# Patient Record
Sex: FEMALE | Race: WHITE
[De-identification: names, ages, dates, MRNs, and addresses within clinical notes are randomized per-mention and may not be internally consistent; named-entity substitution may affect disease eponyms.]

---

## 2021-07-21 ENCOUNTER — HOSPITAL ENCOUNTER (EMERGENCY)
Dept: HOSPITAL 49 - FER | Age: 76
Discharge: HOME | End: 2021-07-21
Payer: MEDICARE

## 2021-07-21 DIAGNOSIS — Z88.2: ICD-10-CM

## 2021-07-21 DIAGNOSIS — Z88.0: ICD-10-CM

## 2021-07-21 DIAGNOSIS — I10: ICD-10-CM

## 2021-07-21 DIAGNOSIS — Z79.52: ICD-10-CM

## 2021-07-21 DIAGNOSIS — R21: Primary | ICD-10-CM

## 2021-07-21 DIAGNOSIS — Z88.1: ICD-10-CM

## 2022-03-14 ENCOUNTER — PRE-ADMISSION TESTING (OUTPATIENT)
Dept: PREADMISSION TESTING | Facility: HOSPITAL | Age: 77
End: 2022-03-14

## 2022-03-14 VITALS
OXYGEN SATURATION: 98 % | RESPIRATION RATE: 16 BRPM | HEART RATE: 75 BPM | BODY MASS INDEX: 34.55 KG/M2 | HEIGHT: 60 IN | DIASTOLIC BLOOD PRESSURE: 78 MMHG | WEIGHT: 176 LBS | SYSTOLIC BLOOD PRESSURE: 172 MMHG | TEMPERATURE: 97.7 F

## 2022-03-14 LAB
ANION GAP SERPL CALCULATED.3IONS-SCNC: 11 MMOL/L (ref 5–15)
BASOPHILS # BLD AUTO: 0.07 10*3/MM3 (ref 0–0.2)
BASOPHILS NFR BLD AUTO: 1.1 % (ref 0–1.5)
BUN SERPL-MCNC: 12 MG/DL (ref 8–23)
BUN/CREAT SERPL: 16.2 (ref 7–25)
CALCIUM SPEC-SCNC: 9.8 MG/DL (ref 8.6–10.5)
CHLORIDE SERPL-SCNC: 102 MMOL/L (ref 98–107)
CO2 SERPL-SCNC: 26 MMOL/L (ref 22–29)
CREAT SERPL-MCNC: 0.74 MG/DL (ref 0.57–1)
DEPRECATED RDW RBC AUTO: 41.1 FL (ref 37–54)
EGFRCR SERPLBLD CKD-EPI 2021: 85 ML/MIN/1.73
EOSINOPHIL # BLD AUTO: 0.12 10*3/MM3 (ref 0–0.4)
EOSINOPHIL NFR BLD AUTO: 1.9 % (ref 0.3–6.2)
ERYTHROCYTE [DISTWIDTH] IN BLOOD BY AUTOMATED COUNT: 12.6 % (ref 12.3–15.4)
GLUCOSE SERPL-MCNC: 102 MG/DL (ref 65–99)
HCT VFR BLD AUTO: 40.6 % (ref 34–46.6)
HGB BLD-MCNC: 14.4 G/DL (ref 12–15.9)
IMM GRANULOCYTES # BLD AUTO: 0.03 10*3/MM3 (ref 0–0.05)
IMM GRANULOCYTES NFR BLD AUTO: 0.5 % (ref 0–0.5)
LYMPHOCYTES # BLD AUTO: 1.61 10*3/MM3 (ref 0.7–3.1)
LYMPHOCYTES NFR BLD AUTO: 25.6 % (ref 19.6–45.3)
MCH RBC QN AUTO: 32.1 PG (ref 26.6–33)
MCHC RBC AUTO-ENTMCNC: 35.5 G/DL (ref 31.5–35.7)
MCV RBC AUTO: 90.6 FL (ref 79–97)
MONOCYTES # BLD AUTO: 0.52 10*3/MM3 (ref 0.1–0.9)
MONOCYTES NFR BLD AUTO: 8.3 % (ref 5–12)
NEUTROPHILS NFR BLD AUTO: 3.93 10*3/MM3 (ref 1.7–7)
NEUTROPHILS NFR BLD AUTO: 62.6 % (ref 42.7–76)
NRBC BLD AUTO-RTO: 0 /100 WBC (ref 0–0.2)
PLATELET # BLD AUTO: 337 10*3/MM3 (ref 140–450)
PMV BLD AUTO: 10 FL (ref 6–12)
POTASSIUM SERPL-SCNC: 4.1 MMOL/L (ref 3.5–5.2)
QT INTERVAL: 376 MS
RBC # BLD AUTO: 4.48 10*6/MM3 (ref 3.77–5.28)
SARS-COV-2 ORF1AB RESP QL NAA+PROBE: NOT DETECTED
SODIUM SERPL-SCNC: 139 MMOL/L (ref 136–145)
WBC NRBC COR # BLD: 6.28 10*3/MM3 (ref 3.4–10.8)

## 2022-03-14 PROCEDURE — 93005 ELECTROCARDIOGRAM TRACING: CPT

## 2022-03-14 PROCEDURE — 80048 BASIC METABOLIC PNL TOTAL CA: CPT

## 2022-03-14 PROCEDURE — 36415 COLL VENOUS BLD VENIPUNCTURE: CPT

## 2022-03-14 PROCEDURE — U0004 COV-19 TEST NON-CDC HGH THRU: HCPCS

## 2022-03-14 PROCEDURE — 93010 ELECTROCARDIOGRAM REPORT: CPT | Performed by: INTERNAL MEDICINE

## 2022-03-14 PROCEDURE — C9803 HOPD COVID-19 SPEC COLLECT: HCPCS

## 2022-03-14 PROCEDURE — 85025 COMPLETE CBC W/AUTO DIFF WBC: CPT

## 2022-03-14 RX ORDER — ASPIRIN 81 MG/1
81 TABLET ORAL DAILY
COMMUNITY

## 2022-03-14 NOTE — DISCHARGE INSTRUCTIONS
Take the following medications the morning of surgery:    NONE    If you are on prescription narcotic pain medication to control your pain you may also take that medication the morning of surgery.    General Instructions:  Do not eat solid food after midnight the night before surgery.  You may drink clear liquids day of surgery but must stop at least one hour before your hospital arrival time.  It is beneficial for you to have a clear drink that contains carbohydrates the day of surgery.  We suggest a 12 to 20 ounce bottle of Gatorade or Powerade for non-diabetic patients     Clear liquids are liquids you can see through.  Nothing red in color.     Plain water                               Sports drinks  Sodas                                   Gelatin (Jell-O)  Fruit juices without pulp such as white grape juice and apple juice  Popsicles that contain no fruit or yogurt  Tea or coffee (no cream or milk added)  Gatorade / Powerade  G2 / Powerade Zero         Bring any papers given to you in the doctor’s office.  Wear clean comfortable clothes.  Do not wear contact lenses, false eyelashes or make-up.  Bring a case for your glasses.   Remove all piercings.  Leave jewelry and any other valuables at home.  The Pre-Admission Testing nurse will instruct you to bring medications if unable to obtain an accurate list in Pre-Admission Testing.    REPORT TO SURGERY ENTRANCE ON 3- AT 0530 AM        Preventing a Surgical Site Infection:  For 2 to 3 days before surgery, avoid shaving with a razor because the razor can irritate skin and make it easier to develop an infection.    Any areas of open skin can increase the risk of a post-operative wound infection by allowing bacteria to enter and travel throughout the body.  Notify your surgeon if you have any skin wounds / rashes even if it is not near the expected surgical site.  The area will need assessed to determine if surgery should be delayed until it is healed.  The  night prior to surgery shower using a fresh bar of anti-bacterial soap (such as Dial) and clean washcloth.  Sleep in a clean bed with clean clothing.  Do not allow pets to sleep with you.  Shower on the morning of surgery using a fresh bar of anti-bacterial soap (such as Dial) and clean washcloth.  Dry with a clean towel and dress in clean clothing.  Ask your surgeon if you will be receiving antibiotics prior to surgery.  Make sure you, your family, and all healthcare providers clean their hands with soap and water or an alcohol based hand  before caring for you or your wound.    Day of surgery:  Your arrival time is approximately two hours before your scheduled surgery time.  Upon arrival, a Pre-op nurse and Anesthesiologist will review your health history, obtain vital signs, and answer questions you may have.  The only belongings needed at this time will be a list of your home medications and if applicable your C-PAP/BI-PAP machine.  A Pre-op nurse will start an IV and you may receive medication in preparation for surgery, including something to help you relax.     Please be aware that surgery does come with discomfort.  We want to make every effort to control your discomfort so please discuss any uncontrolled symptoms with your nurse.   Your doctor will most likely have prescribed pain medications.     If you are staying overnight following surgery, you will be transported to your hospital room following the recovery period.  [facility] has all private rooms.    If you have any questions please call Pre-Admission Testing at [phone].  Deductibles and co-payments are collected on the day of service. Please be prepared to pay the required co-pay, deductible or deposit on the day of service as defined by your plan.    Patient Education for Self-Quarantine Process    Following your COVID testing, we strongly recommend that you wear a mask when you are with other people and practice  social distancing.   Limit your activities to only required outings.  Wash your hands with soap and water frequently for at least 20 seconds.   Avoid touching your eyes, nose and mouth with unwashed hands.  Do not share anything - utensils, drinking glasses, food from the same bowl.   Sanitize household surfaces daily. Include all high touch areas (door handles, light switches, phones, countertops, etc.)    Call your surgeon immediately if you experience any of the following symptoms:  Sore Throat  Shortness of Breath or difficulty breathing  Cough  Chills  Body soreness or muscle pain  Headache  Fever  New loss of taste or smell  Do not arrive for your surgery ill.  Your procedure will need to be rescheduled to another time.  You will need to call your physician before the day of surgery to avoid any unnecessary exposure to hospital staff as well as other patients.

## 2022-03-15 ENCOUNTER — ANESTHESIA EVENT (OUTPATIENT)
Dept: PERIOP | Facility: HOSPITAL | Age: 77
End: 2022-03-15

## 2022-03-16 ENCOUNTER — HOSPITAL ENCOUNTER (OUTPATIENT)
Facility: HOSPITAL | Age: 77
Discharge: HOME | End: 2022-03-17
Attending: OBSTETRICS & GYNECOLOGY | Admitting: OBSTETRICS & GYNECOLOGY

## 2022-03-16 ENCOUNTER — ANESTHESIA (OUTPATIENT)
Dept: PERIOP | Facility: HOSPITAL | Age: 77
End: 2022-03-16

## 2022-03-16 DIAGNOSIS — N81.10 VAGINAL PROLAPSE: ICD-10-CM

## 2022-03-16 DIAGNOSIS — N81.4 PELVIC RELAXATION DUE TO UTEROVAGINAL PROLAPSE: Primary | ICD-10-CM

## 2022-03-16 PROCEDURE — 25010000002 FENTANYL CITRATE (PF) 50 MCG/ML SOLUTION: Performed by: NURSE ANESTHETIST, CERTIFIED REGISTERED

## 2022-03-16 PROCEDURE — 25010000002 NEOSTIGMINE 5 MG/10ML SOLUTION: Performed by: NURSE ANESTHETIST, CERTIFIED REGISTERED

## 2022-03-16 PROCEDURE — C1771 REP DEV, URINARY, W/SLING: HCPCS | Performed by: OBSTETRICS & GYNECOLOGY

## 2022-03-16 PROCEDURE — 25010000002 ROPIVACAINE PER 1 MG: Performed by: OBSTETRICS & GYNECOLOGY

## 2022-03-16 PROCEDURE — 25010000002 PROPOFOL 10 MG/ML EMULSION: Performed by: NURSE ANESTHETIST, CERTIFIED REGISTERED

## 2022-03-16 PROCEDURE — 25010000002 KETOROLAC TROMETHAMINE PER 15 MG: Performed by: OBSTETRICS & GYNECOLOGY

## 2022-03-16 PROCEDURE — 25010000002 PHENYLEPHRINE 10 MG/ML SOLUTION: Performed by: NURSE ANESTHETIST, CERTIFIED REGISTERED

## 2022-03-16 PROCEDURE — 25010000002 EPINEPHRINE PER 0.1 MG: Performed by: OBSTETRICS & GYNECOLOGY

## 2022-03-16 PROCEDURE — 25010000002 ONDANSETRON PER 1 MG: Performed by: NURSE ANESTHETIST, CERTIFIED REGISTERED

## 2022-03-16 PROCEDURE — 88305 TISSUE EXAM BY PATHOLOGIST: CPT | Performed by: OBSTETRICS & GYNECOLOGY

## 2022-03-16 PROCEDURE — 25010000002 GENTAMICIN PER 80 MG: Performed by: OBSTETRICS & GYNECOLOGY

## 2022-03-16 PROCEDURE — 25010000002 DEXAMETHASONE PER 1 MG: Performed by: NURSE ANESTHETIST, CERTIFIED REGISTERED

## 2022-03-16 DEVICE — TRANSVAGINAL MID-URETHRAL SLING
Type: IMPLANTABLE DEVICE | Site: VAGINA | Status: FUNCTIONAL
Brand: ADVANTAGE FIT™  SYSTEM

## 2022-03-16 RX ORDER — EPHEDRINE SULFATE 50 MG/ML
5 INJECTION, SOLUTION INTRAVENOUS ONCE AS NEEDED
Status: DISCONTINUED | OUTPATIENT
Start: 2022-03-16 | End: 2022-03-16 | Stop reason: HOSPADM

## 2022-03-16 RX ORDER — PHENYLEPHRINE HYDROCHLORIDE 10 MG/ML
INJECTION INTRAVENOUS AS NEEDED
Status: DISCONTINUED | OUTPATIENT
Start: 2022-03-16 | End: 2022-03-16 | Stop reason: SURG

## 2022-03-16 RX ORDER — ONDANSETRON 2 MG/ML
4 INJECTION INTRAMUSCULAR; INTRAVENOUS EVERY 6 HOURS PRN
Status: DISCONTINUED | OUTPATIENT
Start: 2022-03-16 | End: 2022-03-17 | Stop reason: HOSPADM

## 2022-03-16 RX ORDER — GLYCOPYRROLATE 0.2 MG/ML
INJECTION INTRAMUSCULAR; INTRAVENOUS AS NEEDED
Status: DISCONTINUED | OUTPATIENT
Start: 2022-03-16 | End: 2022-03-16 | Stop reason: SURG

## 2022-03-16 RX ORDER — ROCURONIUM BROMIDE 10 MG/ML
INJECTION, SOLUTION INTRAVENOUS AS NEEDED
Status: DISCONTINUED | OUTPATIENT
Start: 2022-03-16 | End: 2022-03-16 | Stop reason: SURG

## 2022-03-16 RX ORDER — CLINDAMYCIN PHOSPHATE 900 MG/50ML
900 INJECTION INTRAVENOUS ONCE
Status: COMPLETED | OUTPATIENT
Start: 2022-03-16 | End: 2022-03-16

## 2022-03-16 RX ORDER — OXYCODONE HYDROCHLORIDE AND ACETAMINOPHEN 5; 325 MG/1; MG/1
2 TABLET ORAL EVERY 4 HOURS PRN
Status: DISCONTINUED | OUTPATIENT
Start: 2022-03-16 | End: 2022-03-17 | Stop reason: HOSPADM

## 2022-03-16 RX ORDER — FENTANYL CITRATE 50 UG/ML
INJECTION, SOLUTION INTRAMUSCULAR; INTRAVENOUS AS NEEDED
Status: DISCONTINUED | OUTPATIENT
Start: 2022-03-16 | End: 2022-03-16 | Stop reason: SURG

## 2022-03-16 RX ORDER — KETOROLAC TROMETHAMINE 30 MG/ML
15 INJECTION, SOLUTION INTRAMUSCULAR; INTRAVENOUS EVERY 6 HOURS
Status: DISCONTINUED | OUTPATIENT
Start: 2022-03-16 | End: 2022-03-17 | Stop reason: HOSPADM

## 2022-03-16 RX ORDER — SODIUM CHLORIDE, SODIUM LACTATE, POTASSIUM CHLORIDE, CALCIUM CHLORIDE 600; 310; 30; 20 MG/100ML; MG/100ML; MG/100ML; MG/100ML
100 INJECTION, SOLUTION INTRAVENOUS CONTINUOUS
Status: DISCONTINUED | OUTPATIENT
Start: 2022-03-16 | End: 2022-03-17 | Stop reason: HOSPADM

## 2022-03-16 RX ORDER — LIDOCAINE HYDROCHLORIDE 10 MG/ML
0.5 INJECTION, SOLUTION EPIDURAL; INFILTRATION; INTRACAUDAL; PERINEURAL ONCE AS NEEDED
Status: DISCONTINUED | OUTPATIENT
Start: 2022-03-16 | End: 2022-03-16 | Stop reason: HOSPADM

## 2022-03-16 RX ORDER — OXYCODONE AND ACETAMINOPHEN 7.5; 325 MG/1; MG/1
1 TABLET ORAL EVERY 4 HOURS PRN
Status: DISCONTINUED | OUTPATIENT
Start: 2022-03-16 | End: 2022-03-16 | Stop reason: HOSPADM

## 2022-03-16 RX ORDER — IBUPROFEN 600 MG/1
600 TABLET ORAL ONCE AS NEEDED
Status: DISCONTINUED | OUTPATIENT
Start: 2022-03-16 | End: 2022-03-16 | Stop reason: HOSPADM

## 2022-03-16 RX ORDER — PROPOFOL 10 MG/ML
VIAL (ML) INTRAVENOUS AS NEEDED
Status: DISCONTINUED | OUTPATIENT
Start: 2022-03-16 | End: 2022-03-16 | Stop reason: SURG

## 2022-03-16 RX ORDER — FAMOTIDINE 10 MG/ML
20 INJECTION, SOLUTION INTRAVENOUS ONCE
Status: COMPLETED | OUTPATIENT
Start: 2022-03-16 | End: 2022-03-16

## 2022-03-16 RX ORDER — HYDRALAZINE HYDROCHLORIDE 20 MG/ML
5 INJECTION INTRAMUSCULAR; INTRAVENOUS
Status: DISCONTINUED | OUTPATIENT
Start: 2022-03-16 | End: 2022-03-16 | Stop reason: HOSPADM

## 2022-03-16 RX ORDER — SODIUM CHLORIDE 0.9 % (FLUSH) 0.9 %
3 SYRINGE (ML) INJECTION EVERY 12 HOURS SCHEDULED
Status: DISCONTINUED | OUTPATIENT
Start: 2022-03-16 | End: 2022-03-16 | Stop reason: HOSPADM

## 2022-03-16 RX ORDER — FENTANYL CITRATE 50 UG/ML
50 INJECTION, SOLUTION INTRAMUSCULAR; INTRAVENOUS
Status: DISCONTINUED | OUTPATIENT
Start: 2022-03-16 | End: 2022-03-16 | Stop reason: HOSPADM

## 2022-03-16 RX ORDER — NALOXONE HCL 0.4 MG/ML
0.1 VIAL (ML) INJECTION
Status: DISCONTINUED | OUTPATIENT
Start: 2022-03-16 | End: 2022-03-17 | Stop reason: HOSPADM

## 2022-03-16 RX ORDER — FLUMAZENIL 0.1 MG/ML
0.2 INJECTION INTRAVENOUS AS NEEDED
Status: DISCONTINUED | OUTPATIENT
Start: 2022-03-16 | End: 2022-03-16 | Stop reason: HOSPADM

## 2022-03-16 RX ORDER — DIPHENHYDRAMINE HYDROCHLORIDE 50 MG/ML
12.5 INJECTION INTRAMUSCULAR; INTRAVENOUS
Status: DISCONTINUED | OUTPATIENT
Start: 2022-03-16 | End: 2022-03-16 | Stop reason: HOSPADM

## 2022-03-16 RX ORDER — MAGNESIUM HYDROXIDE 1200 MG/15ML
LIQUID ORAL AS NEEDED
Status: DISCONTINUED | OUTPATIENT
Start: 2022-03-16 | End: 2022-03-16 | Stop reason: HOSPADM

## 2022-03-16 RX ORDER — ONDANSETRON 4 MG/1
4 TABLET, FILM COATED ORAL EVERY 6 HOURS PRN
Status: DISCONTINUED | OUTPATIENT
Start: 2022-03-16 | End: 2022-03-17 | Stop reason: HOSPADM

## 2022-03-16 RX ORDER — ONDANSETRON 2 MG/ML
4 INJECTION INTRAMUSCULAR; INTRAVENOUS ONCE AS NEEDED
Status: DISCONTINUED | OUTPATIENT
Start: 2022-03-16 | End: 2022-03-16 | Stop reason: HOSPADM

## 2022-03-16 RX ORDER — MIDAZOLAM HYDROCHLORIDE 1 MG/ML
0.5 INJECTION INTRAMUSCULAR; INTRAVENOUS
Status: DISCONTINUED | OUTPATIENT
Start: 2022-03-16 | End: 2022-03-16 | Stop reason: HOSPADM

## 2022-03-16 RX ORDER — NEOSTIGMINE METHYLSULFATE 0.5 MG/ML
INJECTION, SOLUTION INTRAVENOUS AS NEEDED
Status: DISCONTINUED | OUTPATIENT
Start: 2022-03-16 | End: 2022-03-16 | Stop reason: SURG

## 2022-03-16 RX ORDER — DEXAMETHASONE SODIUM PHOSPHATE 10 MG/ML
INJECTION INTRAMUSCULAR; INTRAVENOUS AS NEEDED
Status: DISCONTINUED | OUTPATIENT
Start: 2022-03-16 | End: 2022-03-16 | Stop reason: SURG

## 2022-03-16 RX ORDER — HYDROMORPHONE HYDROCHLORIDE 1 MG/ML
0.5 INJECTION, SOLUTION INTRAMUSCULAR; INTRAVENOUS; SUBCUTANEOUS
Status: DISCONTINUED | OUTPATIENT
Start: 2022-03-16 | End: 2022-03-17 | Stop reason: HOSPADM

## 2022-03-16 RX ORDER — SODIUM CHLORIDE 0.9 % (FLUSH) 0.9 %
3-10 SYRINGE (ML) INJECTION AS NEEDED
Status: DISCONTINUED | OUTPATIENT
Start: 2022-03-16 | End: 2022-03-16 | Stop reason: HOSPADM

## 2022-03-16 RX ORDER — HYDROCODONE BITARTRATE AND ACETAMINOPHEN 7.5; 325 MG/1; MG/1
1 TABLET ORAL ONCE AS NEEDED
Status: DISCONTINUED | OUTPATIENT
Start: 2022-03-16 | End: 2022-03-16 | Stop reason: HOSPADM

## 2022-03-16 RX ORDER — ONDANSETRON 2 MG/ML
INJECTION INTRAMUSCULAR; INTRAVENOUS AS NEEDED
Status: DISCONTINUED | OUTPATIENT
Start: 2022-03-16 | End: 2022-03-16 | Stop reason: SURG

## 2022-03-16 RX ORDER — NALOXONE HCL 0.4 MG/ML
0.2 VIAL (ML) INJECTION AS NEEDED
Status: DISCONTINUED | OUTPATIENT
Start: 2022-03-16 | End: 2022-03-16 | Stop reason: HOSPADM

## 2022-03-16 RX ORDER — LIDOCAINE HYDROCHLORIDE 20 MG/ML
INJECTION, SOLUTION INFILTRATION; PERINEURAL AS NEEDED
Status: DISCONTINUED | OUTPATIENT
Start: 2022-03-16 | End: 2022-03-16 | Stop reason: SURG

## 2022-03-16 RX ORDER — DOCUSATE SODIUM 100 MG/1
100 CAPSULE, LIQUID FILLED ORAL 2 TIMES DAILY PRN
Status: DISCONTINUED | OUTPATIENT
Start: 2022-03-16 | End: 2022-03-17 | Stop reason: HOSPADM

## 2022-03-16 RX ORDER — PROMETHAZINE HYDROCHLORIDE 25 MG/1
25 SUPPOSITORY RECTAL ONCE AS NEEDED
Status: DISCONTINUED | OUTPATIENT
Start: 2022-03-16 | End: 2022-03-16 | Stop reason: HOSPADM

## 2022-03-16 RX ORDER — PROMETHAZINE HYDROCHLORIDE 25 MG/1
25 TABLET ORAL ONCE AS NEEDED
Status: DISCONTINUED | OUTPATIENT
Start: 2022-03-16 | End: 2022-03-16 | Stop reason: HOSPADM

## 2022-03-16 RX ORDER — LABETALOL HYDROCHLORIDE 5 MG/ML
5 INJECTION, SOLUTION INTRAVENOUS
Status: DISCONTINUED | OUTPATIENT
Start: 2022-03-16 | End: 2022-03-16 | Stop reason: HOSPADM

## 2022-03-16 RX ORDER — DIPHENHYDRAMINE HCL 25 MG
25 CAPSULE ORAL
Status: DISCONTINUED | OUTPATIENT
Start: 2022-03-16 | End: 2022-03-16 | Stop reason: HOSPADM

## 2022-03-16 RX ORDER — SODIUM CHLORIDE, SODIUM LACTATE, POTASSIUM CHLORIDE, CALCIUM CHLORIDE 600; 310; 30; 20 MG/100ML; MG/100ML; MG/100ML; MG/100ML
9 INJECTION, SOLUTION INTRAVENOUS CONTINUOUS
Status: DISCONTINUED | OUTPATIENT
Start: 2022-03-16 | End: 2022-03-16

## 2022-03-16 RX ORDER — HYDROMORPHONE HYDROCHLORIDE 1 MG/ML
0.5 INJECTION, SOLUTION INTRAMUSCULAR; INTRAVENOUS; SUBCUTANEOUS
Status: DISCONTINUED | OUTPATIENT
Start: 2022-03-16 | End: 2022-03-16 | Stop reason: HOSPADM

## 2022-03-16 RX ADMIN — FENTANYL CITRATE 50 MCG: 50 INJECTION INTRAMUSCULAR; INTRAVENOUS at 10:07

## 2022-03-16 RX ADMIN — FENTANYL CITRATE 50 MCG: 50 INJECTION INTRAMUSCULAR; INTRAVENOUS at 08:54

## 2022-03-16 RX ADMIN — GLYCOPYRROLATE 0.2 MG: 0.2 INJECTION INTRAMUSCULAR; INTRAVENOUS at 09:29

## 2022-03-16 RX ADMIN — LIDOCAINE HYDROCHLORIDE 100 MG: 20 INJECTION, SOLUTION INFILTRATION; PERINEURAL at 07:42

## 2022-03-16 RX ADMIN — PHENYLEPHRINE HYDROCHLORIDE 100 MCG: 10 INJECTION, SOLUTION INTRAVENOUS at 08:40

## 2022-03-16 RX ADMIN — KETOROLAC TROMETHAMINE 15 MG: 30 INJECTION, SOLUTION INTRAMUSCULAR; INTRAVENOUS at 10:44

## 2022-03-16 RX ADMIN — FENTANYL CITRATE 50 MCG: 50 INJECTION INTRAMUSCULAR; INTRAVENOUS at 07:42

## 2022-03-16 RX ADMIN — ONDANSETRON 4 MG: 2 INJECTION INTRAMUSCULAR; INTRAVENOUS at 09:29

## 2022-03-16 RX ADMIN — FAMOTIDINE 20 MG: 10 INJECTION INTRAVENOUS at 07:12

## 2022-03-16 RX ADMIN — GENTAMICIN SULFATE 300 MG: 40 INJECTION, SOLUTION INTRAMUSCULAR; INTRAVENOUS at 07:50

## 2022-03-16 RX ADMIN — KETOROLAC TROMETHAMINE 15 MG: 30 INJECTION, SOLUTION INTRAMUSCULAR; INTRAVENOUS at 18:40

## 2022-03-16 RX ADMIN — NEOSTIGMINE METHYLSULFATE 2 MG: 0.5 INJECTION INTRAVENOUS at 09:29

## 2022-03-16 RX ADMIN — KETOROLAC TROMETHAMINE 15 MG: 30 INJECTION, SOLUTION INTRAMUSCULAR; INTRAVENOUS at 23:10

## 2022-03-16 RX ADMIN — PROPOFOL 150 MG: 10 INJECTION, EMULSION INTRAVENOUS at 07:42

## 2022-03-16 RX ADMIN — ROCURONIUM BROMIDE 40 MG: 50 INJECTION INTRAVENOUS at 07:43

## 2022-03-16 RX ADMIN — GLYCOPYRROLATE 0.1 MG: 0.2 INJECTION INTRAMUSCULAR; INTRAVENOUS at 07:42

## 2022-03-16 RX ADMIN — DEXAMETHASONE SODIUM PHOSPHATE 8 MG: 10 INJECTION INTRAMUSCULAR; INTRAVENOUS at 07:42

## 2022-03-16 RX ADMIN — FENTANYL CITRATE 50 MCG: 50 INJECTION INTRAMUSCULAR; INTRAVENOUS at 10:35

## 2022-03-16 RX ADMIN — SODIUM CHLORIDE, POTASSIUM CHLORIDE, SODIUM LACTATE AND CALCIUM CHLORIDE 9 ML/HR: 600; 310; 30; 20 INJECTION, SOLUTION INTRAVENOUS at 07:12

## 2022-03-16 RX ADMIN — SODIUM CHLORIDE, POTASSIUM CHLORIDE, SODIUM LACTATE AND CALCIUM CHLORIDE 100 ML/HR: 600; 310; 30; 20 INJECTION, SOLUTION INTRAVENOUS at 12:40

## 2022-03-16 RX ADMIN — SODIUM CHLORIDE, POTASSIUM CHLORIDE, SODIUM LACTATE AND CALCIUM CHLORIDE 9 ML/HR: 600; 310; 30; 20 INJECTION, SOLUTION INTRAVENOUS at 10:09

## 2022-03-16 RX ADMIN — CLINDAMYCIN PHOSPHATE 900 MG: 900 INJECTION, SOLUTION INTRAVENOUS at 07:12

## 2022-03-16 NOTE — ANESTHESIA PREPROCEDURE EVALUATION
Anesthesia Evaluation     Patient summary reviewed and Nursing notes reviewed   NPO Solid Status: > 8 hours  NPO Liquid Status: > 2 hours           Airway   Mallampati: II  TM distance: >3 FB  Neck ROM: full  no difficulty expected  Dental - normal exam     Pulmonary - negative pulmonary ROS and normal exam   (-) decreased breath sounds, wheezes  Cardiovascular - normal exam  Exercise tolerance: good (4-7 METS)    (+) hypertension,       Neuro/Psych  (+) CVA (TIA in 1990s),    (-) seizures  GI/Hepatic/Renal/Endo    (+) obesity,  GERD,    (-) diabetes    Musculoskeletal (-) negative ROS    Abdominal  - normal exam   Substance History - negative use  (-) alcohol use, drug use     OB/GYN negative ob/gyn ROS         Other - negative ROS                       Anesthesia Plan    ASA 2     general     intravenous induction     Anesthetic plan, all risks, benefits, and alternatives have been provided, discussed and informed consent has been obtained with: patient.    Plan discussed with CRNA.        CODE STATUS:

## 2022-03-16 NOTE — ANESTHESIA PROCEDURE NOTES
Airway  Urgency: elective    Date/Time: 3/16/2022 7:45 AM  Airway not difficult    General Information and Staff    Patient location during procedure: OR  Anesthesiologist: Garfield Ureña MD  CRNA: Cayla Keys CRNA    Indications and Patient Condition  Indications for airway management: airway protection    Preoxygenated: yes  MILS maintained throughout  Mask difficulty assessment: 2 - vent by mask + OA or adjuvant +/- NMBA    Final Airway Details  Final airway type: endotracheal airway      Successful airway: ETT  Cuffed: yes   Successful intubation technique: direct laryngoscopy  Endotracheal tube insertion site: oral  Blade: Jayden  Blade size: 3  ETT size (mm): 7.0  Cormack-Lehane Classification: grade I - full view of glottis  Placement verified by: chest auscultation and capnometry   Cuff volume (mL): 6  Measured from: teeth  ETT/EBT  to teeth (cm): 20  Number of attempts at approach: 1  Assessment: lips, teeth, and gum same as pre-op and atraumatic intubation

## 2022-03-16 NOTE — OP NOTE
Date of Service:  03/16/22    Surgeon:  Assistant: MD DEBBIE Veras [facility]         Pre-operative diagnosis(es): Complete pelvic prolapse     Post-operative diagnosis(es): Post-Op Diagnosis Codes:   same       Procedure(s): Procedure(s):  TOTAL VAGINAL HYSTERECTOMY TENSION FREE VAGINAL TAPING, CYSTOSCOPY  ANTERIOR/POSTERIOR REPAIR  SACRAL SPINOUS FIXATION           Anesthesia: Type: General       Objective          Specimens removed: Specimens     ID Source Type Tests Collected By Collected At Frozen?    A Uterus with Cervix Tissue · TISSUE PATHOLOGY EXAM   Valentina Valiente MD 3/16/22 0808     Description: cervix, uterus    This specimen was not marked as sent.             EBL:    Specimens: 100ml    Order Name Source Comment Collection Info Order Time   TISSUE PATHOLOGY EXAM Uterus with Cervix  Collected By: Valentina Valiente MD 3/16/2022  8:09 AM     Release to patient   Immediate                             Complications:       None       Operative Findings:  Time of surgery the uterus was noted to be normal in gross appearance.  Both tubes and ovaries were normal in gross appearance.  The anterior the bladder was normal.  Both ureteral orifices were visualized following anterior repair and TVT.           Description of Procedure:  Patient was taken the operating room with an IV infusing placed the operating table in dorsal spine position.  After induction of general anesthesia was placed in dorsolithotomy position.  The perineum and vagina were prepped and draped in usual fashion.  A weighted speculum was used to visualize the cervix.  The cervix was grasped with single-tooth tenaculum.  Submucosal tissue was injected at the level of the portio using local anesthesia.  An incision was then made in circumferential fashion.  The posterior cul-de-sac was entered sharply.  The bladder dissected free from the lower uterine segment anteriorly and the anterior cul-de-sac entered sharply.  Uterosacral  ligaments were clamped cut and ligated bilaterally using 0 Vicryl in Luci fashion.  The cardinal ligaments were clamped cut and ligated and several bites on each side using 0 Vicryl in Luci fashion.  The tube and ovarian suspensory ligaments were clamped on each side cut and these pedicles tied using 0 Vicryl tying on both sides of the clamp.  The uterus was removed and sent to pathology for study.  All pedicles were examined and noted to be hemostatic.  The posterior vaginal cuff was then run using 0 Vicryl in a running locking fashion starting the right corner into the left and tying.  The uterosacral ligaments were then reapproximated in the midline using a modified Vazquez's culdoplasty.  Attention was then taken to the anterior colporrhaphy.    The submucosal tissue was injected in the midline of the anterior vaginal wall with local anesthesia.  Mucosa was then undermined in the midline from underlying tissue.  After the vaginal mucosa was incised the underlying tissue was dissected free bilaterally until the cystocele was completely reduced.  The cystocele was then imbricated using 3-0 Vicryl and interrupted mattress stitches.  Once the cystocele been completely reduced the vaginal mucosa was closed using 3-0 Vicryl in a running locking fashion start the superior apex running down across the vaginal cuff closing side to side and tying.  Attention was then taken to the TVT.    The anterior vaginal wall was injected beneath the bladder neck using local anesthesia.  Incision was then made 3 cm in length beneath the bladder neck.  The underlying tissue was then dissected free bilaterally up to the inferior pubic ramus.  The cystoscope was placed within the bladder and the bladder noted to be normal gross appearance.  Both ureteral orifices were identified in each noted to be spilling urine.  The cystoscope was removed.  The bladder was drained.  The TVT advantage fit device was then placed without difficulty.   The cystoscope was once again placed within the bladder and there were noted to be no areas of trauma to the bladder.  Bladder was once again drained.  The TVT device was pulled into place.  Both ends were cut just beneath the skin on the mons.  The anterior vaginal mucosa was then closed using 3-0 Vicryl in a running locking stitch starting the superior picture into the inferior packs and tying.  Glue was used to close the puncture sites on the mons.  Attention was then taken to the posterior repair.    The submucosal tissue was injected in the midline of the posterior vaginal wall using local anesthesia.  An incision was then made at the introitus.  The vaginal mucosa was dissected free from the underlying tissue in the midline.  An incision was then made along the posterior vaginal wall.  The underlying tissue was dissected free bilaterally until the rectocele been reduced.  At that time the sacrospinous stitches were placed.  A [place] was used to place to stitches through the sacrospinous ligament of #1 Prolene.  Freehand of each stitch was then placed through the uterosacral pedicles.  The stitches were left to be tied later in the procedure.  The rectocele was then reduced using interrupted stitches of 2-0 Vicryl.  Once a shelf within Jonancy the rectocele completely reduced the posterior vaginal mucosa was closed using 3-0 Vicryl in a running locking fashion start the super apex running toward the introitus.  Prior to completion of closure the mucosa of the sacrospinous stitches were tied.  The remainder of the vaginal mucosa posteriorly was then completely closed using 3-0 Vicryl in usual fashion.  Good hemostasis was assured.  The bladder was draining clear yellow urine.  Sponge, instrument needle counts were correct as reported.  Patient tolerated procedure without difficulty.  Patient was awakened easily taken to postanesthesia care unit in good condition.                     Valentina Valiente,  MD  03/16/22  09:38 EDT

## 2022-03-16 NOTE — PLAN OF CARE
Goal Outcome Evaluation:  Plan of Care Reviewed With: patient        Progress: improving  Outcome Evaluation: VSS, IVFs as ordered, ambulated in halls, up in chair, on room air, vag packing in place, tolerating regular diet, pain controlled w/ scheduled meds, brother at bedside for part of shift, family called to check on patient, told them she was doing well and encouraged them to call patient. Will continue to monitor.

## 2022-03-16 NOTE — ANESTHESIA POSTPROCEDURE EVALUATION
Patient: Sarah Hicks    Procedure Summary     Date: 03/16/22 Room / Location:  [facility]  /  [facility]    Anesthesia Start: 0736 Anesthesia Stop: 0944    Procedures:       TOTAL VAGINAL HYSTERECTOMY TENSION FREE VAGINAL TAPING, CYSTOSCOPY (N/A Vagina)      ANTERIOR/POSTERIOR REPAIR (N/A Vagina)      SACRAL SPINOUS FIXATION (N/A ) Diagnosis:     Surgeons: Valentina Valiente MD Provider: Garfield Ureña MD    Anesthesia Type: general ASA Status: 2          Anesthesia Type: general    Vitals  Vitals Value Taken Time   /73 03/16/22 1059   Temp 36.2 °C (97.2 °F) 03/16/22 1059   Pulse 76 03/16/22 1101   Resp 16 03/16/22 1059   SpO2 100 % 03/16/22 1101   Vitals shown include unvalidated device data.        Post Anesthesia Care and Evaluation    Patient location during evaluation: bedside  Patient participation: complete - patient participated  Level of consciousness: awake and alert  Pain management: adequate  Airway patency: patent  Anesthetic complications: No anesthetic complications  PONV Status: none  Cardiovascular status: acceptable  Respiratory status: acceptable  Hydration status: acceptable    Comments: /73 (BP Location: Left arm, Patient Position: Sitting)   Pulse 68   Temp 36.2 °C (97.2 °F) (Temporal)   Resp 16   SpO2 100%

## 2022-03-16 NOTE — H&P
[facility]   HISTORY AND PHYSICAL    Patient Name:Sarah Hicks  : 1945  MRN: [id number]  Primary Care Physician: Doc Naranjo APRN  Date of admission: 3/16/2022    Subjective   Subjective     Chief Complaint: Something protruding from the vagina, urinary incontinence    History of Present Illness   Sarah Hicks is a 76 y.o. female  3 para 3 who presented my office with complaints of something protruding from her vagina.  This is been progressively getting worse over the past several months.  She stated it was causing discomfort.  She has urinary incontinence.  Urodynamic testing was done which was consistent with genuine urinary incontinence.  Patient also has significant bulging of the rectum and sometimes difficulty with bowel movements.  Patient presents today for definitive therapy.    Review of Systems      Personal History     Past Medical History:   Diagnosis Date   • Arthritis    • Bladder prolapse, female, acquired    • Deviated septum    • History of esophageal reflux    • Hypertension    • Seasonal allergies    • Stroke (HCC)     POSSIBLE MINI STROKE    • Uterine prolapse        Past Surgical History:   Procedure Laterality Date   • BREAST BIOPSY Left     BENIGN   • CARPAL TUNNEL RELEASE Right     X 2   • CARPAL TUNNEL RELEASE Left    • CATARACT EXTRACTION W/ INTRAOCULAR LENS  IMPLANT, BILATERAL     • NISSEN FUNDOPLICATION LAPAROSCOPIC     • TUBAL COAGULATION LAPAROSCOPIC         Family History: Her family history is not on file.     Social History: She  reports that she has never smoked. She does not have any smokeless tobacco history on file. She reports that she does not drink alcohol and does not use drugs.    Home Medications:  aspirin and benazepril-hydrochlorthiazide    Allergies:  She is allergic to penicillins, erythromycin, flagyl [metronidazole], and sulfa antibiotics.    Objective    Objective     Vitals:    Temp:  [98.1 °F (36.7 °C)] 98.1 °F (36.7  °C)  Heart Rate:  [73] 73  Resp:  [20] 20  BP: (136)/(68) 136/68    Physical Exam     Heart: Regular rate without murmur  Chest: Clear to auscultation bilaterally  Abdomen: Soft, nontender, no masses  Extremities: Within normal limits  Pelvic exam: Vagina, cervix, external genitalia-normal.  Bimanual examination: Uterus is in the midline regular size and shape no adnexal masses.  The cervix is noted to be protruding from the introitus.  There is also a large cystocele and rectocele at the introitus.  Assessment/Plan   Assessment / Plan     Plan:   Total pelvic prolapse with cystocele and rectocele.  Plan total vaginal hysterectomy, anterior and posterior colporrhaphy, TVT with cystoscopy, sacrospinous fixation  The risk and benefits of the procedure been discussed with the patient including damage to the urinary tract, bowel, infection, bleeding, and/or anesthetic complications.  Patient desires to proceed with surgery at this time.    DVT prophylaxis:  No DVT prophylaxis order currently exists.    Valentina Valiente MD

## 2022-03-17 VITALS
DIASTOLIC BLOOD PRESSURE: 66 MMHG | RESPIRATION RATE: 18 BRPM | SYSTOLIC BLOOD PRESSURE: 161 MMHG | TEMPERATURE: 97 F | OXYGEN SATURATION: 96 % | HEART RATE: 63 BPM

## 2022-03-17 LAB
BASOPHILS # BLD AUTO: 0.02 10*3/MM3 (ref 0–0.2)
BASOPHILS NFR BLD AUTO: 0.2 % (ref 0–1.5)
DEPRECATED RDW RBC AUTO: 40.8 FL (ref 37–54)
EOSINOPHIL # BLD AUTO: 0.04 10*3/MM3 (ref 0–0.4)
EOSINOPHIL NFR BLD AUTO: 0.4 % (ref 0.3–6.2)
ERYTHROCYTE [DISTWIDTH] IN BLOOD BY AUTOMATED COUNT: 12.4 % (ref 12.3–15.4)
HCT VFR BLD AUTO: 32 % (ref 34–46.6)
HGB BLD-MCNC: 11 G/DL (ref 12–15.9)
IMM GRANULOCYTES # BLD AUTO: 0.04 10*3/MM3 (ref 0–0.05)
IMM GRANULOCYTES NFR BLD AUTO: 0.4 % (ref 0–0.5)
LAB AP CASE REPORT: NORMAL
LYMPHOCYTES # BLD AUTO: 1.71 10*3/MM3 (ref 0.7–3.1)
LYMPHOCYTES NFR BLD AUTO: 17.4 % (ref 19.6–45.3)
MCH RBC QN AUTO: 31.4 PG (ref 26.6–33)
MCHC RBC AUTO-ENTMCNC: 34.4 G/DL (ref 31.5–35.7)
MCV RBC AUTO: 91.4 FL (ref 79–97)
MONOCYTES # BLD AUTO: 1.05 10*3/MM3 (ref 0.1–0.9)
MONOCYTES NFR BLD AUTO: 10.7 % (ref 5–12)
NEUTROPHILS NFR BLD AUTO: 6.96 10*3/MM3 (ref 1.7–7)
NEUTROPHILS NFR BLD AUTO: 70.9 % (ref 42.7–76)
NRBC BLD AUTO-RTO: 0 /100 WBC (ref 0–0.2)
PATH REPORT.FINAL DX SPEC: NORMAL
PATH REPORT.GROSS SPEC: NORMAL
PLATELET # BLD AUTO: 271 10*3/MM3 (ref 140–450)
PMV BLD AUTO: 10.1 FL (ref 6–12)
RBC # BLD AUTO: 3.5 10*6/MM3 (ref 3.77–5.28)
WBC NRBC COR # BLD: 9.82 10*3/MM3 (ref 3.4–10.8)

## 2022-03-17 PROCEDURE — 63710000001 OXYCODONE-ACETAMINOPHEN 5-325 MG TABLET: Performed by: OBSTETRICS & GYNECOLOGY

## 2022-03-17 PROCEDURE — 85025 COMPLETE CBC W/AUTO DIFF WBC: CPT | Performed by: OBSTETRICS & GYNECOLOGY

## 2022-03-17 PROCEDURE — 25010000002 KETOROLAC TROMETHAMINE PER 15 MG: Performed by: OBSTETRICS & GYNECOLOGY

## 2022-03-17 PROCEDURE — A9270 NON-COVERED ITEM OR SERVICE: HCPCS | Performed by: OBSTETRICS & GYNECOLOGY

## 2022-03-17 PROCEDURE — 63710000001 HYDROCHLOROTHIAZIDE 12.5 MG CAPSULE 100 EACH BOTTLE: Performed by: OBSTETRICS & GYNECOLOGY

## 2022-03-17 PROCEDURE — 63710000001 LISINOPRIL 10 MG TABLET 1,000 EACH BOTTLE: Performed by: OBSTETRICS & GYNECOLOGY

## 2022-03-17 PROCEDURE — 63710000001 DOCUSATE SODIUM 100 MG CAPSULE: Performed by: OBSTETRICS & GYNECOLOGY

## 2022-03-17 RX ORDER — PSEUDOEPHEDRINE HCL 30 MG
100 TABLET ORAL 2 TIMES DAILY PRN
Qty: 14 CAPSULE | Refills: 1 | Status: SHIPPED | OUTPATIENT
Start: 2022-03-17

## 2022-03-17 RX ORDER — IBUPROFEN 600 MG/1
600 TABLET ORAL EVERY 6 HOURS PRN
Qty: 30 TABLET | Refills: 0 | Status: SHIPPED | OUTPATIENT
Start: 2022-03-17

## 2022-03-17 RX ORDER — OXYCODONE HYDROCHLORIDE AND ACETAMINOPHEN 5; 325 MG/1; MG/1
2 TABLET ORAL EVERY 4 HOURS PRN
Qty: 10 TABLET | Refills: 0 | Status: SHIPPED | OUTPATIENT
Start: 2022-03-17 | End: 2022-03-26

## 2022-03-17 RX ADMIN — SODIUM CHLORIDE, POTASSIUM CHLORIDE, SODIUM LACTATE AND CALCIUM CHLORIDE 500 ML: 600; 310; 30; 20 INJECTION, SOLUTION INTRAVENOUS at 00:16

## 2022-03-17 RX ADMIN — SODIUM CHLORIDE, POTASSIUM CHLORIDE, SODIUM LACTATE AND CALCIUM CHLORIDE 100 ML/HR: 600; 310; 30; 20 INJECTION, SOLUTION INTRAVENOUS at 10:49

## 2022-03-17 RX ADMIN — KETOROLAC TROMETHAMINE 15 MG: 30 INJECTION, SOLUTION INTRAMUSCULAR; INTRAVENOUS at 05:35

## 2022-03-17 RX ADMIN — KETOROLAC TROMETHAMINE 15 MG: 30 INJECTION, SOLUTION INTRAMUSCULAR; INTRAVENOUS at 10:48

## 2022-03-17 RX ADMIN — DOCUSATE SODIUM 100 MG: 100 CAPSULE, LIQUID FILLED ORAL at 05:41

## 2022-03-17 RX ADMIN — OXYCODONE AND ACETAMINOPHEN 1 TABLET: 5; 325 TABLET ORAL at 05:41

## 2022-03-17 NOTE — NURSING NOTE
VSS, up w/ standby assist, vaginal spotting present, patient had greater than 150 mL in bladder and is going home with a martini catheter. No nausea, no pain meds given. Thoroughly educated patient on catheter care - especially cleaning and emptying the bag. She had concerns about when to take the catheter out and I stressed that she is not to take this out, it will be removed at a follow up appointment. Educated on post op care, when to seek help, new meds, and to call the MD office to confirm her appt. Meds via meds to beds, pt. Brother is taking her home.

## 2022-03-17 NOTE — DISCHARGE SUMMARY
Date of Discharge:  3/17/2022      Presenting Problem/History of Present Illness  Pelvic relaxation due to uterovaginal prolapse [N81.4]         Procedures Performed  Procedure(s):  TOTAL VAGINAL HYSTERECTOMY TENSION FREE VAGINAL TAPING, CYSTOSCOPY  ANTERIOR/POSTERIOR REPAIR  SACRAL SPINOUS FIXATION         Condition on Discharge:  Post-Op Day 1   Subjective     Patient reports:    Doing well - ready for discharge. Pain well controlled. Tolerating po and having flatus. Ambulating without difficulty.     Vital Signs  Temp:  [96.2 °F (35.7 °C)-98.3 °F (36.8 °C)] 97 °F (36.1 °C)  Heart Rate:  [66-76] 76  Resp:  [16-18] 18  BP: (139-166)/(64-75) 160/69    Physical Exam    Gen Alert and awake   Abdomen Soft, ND, NT   Incision  Intact, without erythema or exudate   Extremities Calves NT bilaterally     Assessment/Plan ]   Assessment:  POD 1  -  Doing well. Will need voiding trial. If PVR <150 ml d/c without catheter. Otherwise replace cath for D/C.  RTO on Mon for removal. Stable for discharge.     Plan:    Instructions reviewed       Consults:   Consults     No orders found for last 30 day(s).          Discharge Disposition  Home or Self Care    Discharge Medications     Discharge Medications      New Medications      Instructions Start Date   docusate sodium 100 MG capsule   100 mg, Oral, 2 Times Daily PRN      ibuprofen 600 MG tablet  Commonly known as: ADVIL,MOTRIN   600 mg, Oral, Every 6 Hours PRN      oxyCODONE-acetaminophen 5-325 MG per tablet  Commonly known as: PERCOCET   2 tablets, Oral, Every 4 Hours PRN         Continue These Medications      Instructions Start Date   aspirin 81 MG EC tablet   81 mg, Oral, Daily      benazepril-hydrochlorthiazide 10-12.5 MG per tablet  Commonly known as: LOTENSIN HCT   1 tablet, Oral, Daily             The patient has been prescribed a controlled substance.  She has been counseled on the risks associated with using the medication.   The addictive potential of this medication  and alternatives were discussed carefully with this patient and she demonstrated understanding.  A ERICKA report has been obtained and reviewed.    Activity at Discharge: restrictions reviewed    Follow-up Appointments  2 wks      Test Results Pending at Discharge  Pending Labs     Order Current Status    Tissue Pathology Exam In process           Valentina Valiente MD  03/17/22  12:46 EDT

## 2022-03-17 NOTE — PROGRESS NOTES
RN called. Dec urine output.     200cc over 11 hrs. RN says stable. No pain. Has martini and vag packing. No abd pain.   RN says no reason to think bleeding abd and not seeing and blood vaginally    ebl 100cc, good starting hb.    /75 (BP Location: Left arm, Patient Position: Sitting)   Pulse 66   Temp 97.4 °F (36.3 °C) (Oral)   Resp 18   SpO2 97%     Will give 500cc LR fluid bolus

## 2022-03-17 NOTE — PLAN OF CARE
Goal Outcome Evaluation:  Plan of Care Reviewed With: patient        Progress: improving  Outcome Evaluation: vss, afebrile, Vaginal packing removed- peripad in place w/ small amount of drainage, F/C in place w/ minimal output- on call MD aware and ordered 500LR bolus x1, ivf infusing, scheduled toradol given, scds on while in bed, denies pain/nausea, ambulated in halls, encouraged IS use, killian regular diet

## 2022-03-17 NOTE — NURSING NOTE
Spoke to Rosa Gibbons MD about pt inadequate urine output tonight. Dr. Gibbons advised me to leave in F/C until  makes her rounds.

## 2022-03-17 NOTE — DISCHARGE INSTRUCTIONS
Clean around catheter insertion site 2x per day and after a bowel movement. Use soap and warm water.   Empty bag as needed.  Wash hands prior to handling catheter.

## 2022-03-17 NOTE — PROGRESS NOTES
Continued Stay Note  [facility]     Patient Name: Sarah Hicks  MRN: [id number]  Today's Date: 3/17/2022    Admit Date: 3/16/2022     Discharge Plan     Row Name 03/17/22 1325       Plan    Plan Home no needs    Plan Comments Discharge order noted. Met with patient who confirmed DC plan is home. Stated her brother will assist as needed and will provide transportation at DC. Denies any needs/equipment.               Discharge Codes    No documentation.               Expected Discharge Date and Time     Expected Discharge Date Expected Discharge Time    Mar 17, 2022             Cami Murphy RN

## 2022-03-18 NOTE — PROGRESS NOTES
Case Management Discharge Note      Final Note: Discharge home. Cami Murphy, ARIANA         Transportation Services  Private: Car    Final Discharge Disposition Code: 01 - home or self-care

## 2022-06-27 ENCOUNTER — HOSPITAL ENCOUNTER (EMERGENCY)
Dept: HOSPITAL 49 - FER | Age: 77
Discharge: HOME | End: 2022-06-27
Payer: MEDICARE

## 2022-06-27 DIAGNOSIS — Z88.2: ICD-10-CM

## 2022-06-27 DIAGNOSIS — Z88.1: ICD-10-CM

## 2022-06-27 DIAGNOSIS — Z88.0: ICD-10-CM

## 2022-06-27 DIAGNOSIS — R30.0: Primary | ICD-10-CM

## 2022-06-27 LAB
BASOPHIL: 1.1 % (ref 0–2)
BILIRUBIN: NEGATIVE MG/DL
BLOOD: NEGATIVE ERY/UL
BUN SERPL-MCNC: 14 MG/DL (ref 7–18)
BUN/CREAT RATIO (CALC): 19.4 RATIO
CHLORIDE: 104 MMOL/L (ref 98–107)
CLARITY UR: CLEAR
CO2 (BICARBONATE): 27 MMOL/L (ref 21–32)
COLOR: YELLOW
CREATININE: 0.72 MG/DL (ref 0.51–0.95)
EOSINOPHIL: 4.1 % (ref 0–7)
GLUCOSE (U): NORMAL MG/DL
GLUCOSE SERPL-MCNC: 115 MG/DL (ref 74–106)
HCT: 40.5 % (ref 37–47)
HGB BLD-MCNC: 13.9 G/DL (ref 12.5–16)
LEUKOCYTES: NEGATIVE LEU/UL
LYMPHOCYTE: 22.7 % (ref 15–48)
MCH RBC QN AUTO: 32.3 PG (ref 25–31)
MCHC RBC AUTO-ENTMCNC: 34.3 G/DL (ref 32–36)
MCV: 94.2 FL (ref 78–100)
MONOCYTE: 7.7 % (ref 0–12)
MPV: 9.9 FL (ref 6–9.5)
NEUTROPHIL: 64.2 % (ref 41–80)
NITRITE: NEGATIVE MG/DL
NRBC: 0
PLT: 271 K/UL (ref 150–400)
POTASSIUM: 4.1 MMOL/L (ref 3.5–5.1)
PROTEIN: NEGATIVE MG/DL
RBC MORPHOLOGY: NORMAL
RBC: 4.3 M/UL (ref 4.2–5.4)
RDW: 12.8 % (ref 11.5–14)
SPECIFIC GRAVITY: <=1.005 (ref 1–1.03)
UROBILINOGEN: 0.2 MG/DL (ref 0.2–1)
WBC: 5.4 K/UL (ref 4–10.5)

## (undated) DEVICE — TRAP FLD MINIVAC MEGADYNE 100ML

## (undated) DEVICE — SUT VIC 3/0 CT2 27IN J232H

## (undated) DEVICE — SUT PROLN 1 MO6 36IN

## (undated) DEVICE — SUT VICYL 2/0 CR8 18IN DYED J726D

## (undated) DEVICE — UNDYED BRAIDED (POLYGLACTIN 910), SYNTHETIC ABSORBABLE SUTURE: Brand: COATED VICRYL

## (undated) DEVICE — INTENDED FOR TISSUE SEPARATION, AND OTHER PROCEDURES THAT REQUIRE A SHARP SURGICAL BLADE TO PUNCTURE OR CUT.: Brand: BARD-PARKER ® CARBON RIB-BACK BLADES

## (undated) DEVICE — ST IRR CYSTO W/SPK 77IN LF

## (undated) DEVICE — PAD SANI MAXI W/ADHS SNG WRP 11IN

## (undated) DEVICE — SUT VIC 0 TIES 18IN J912G

## (undated) DEVICE — INTENT TO BE USED WITH SUTURE MATERIAL FOR TISSUE CLOSURE: Brand: RICHARD-ALLAN® NEEDLE 1/2 CIRCLE TAPER

## (undated) DEVICE — TBG PENCL TELESCP MEGADYNE SMOKE EVAC 10FT

## (undated) DEVICE — ADHS SKIN SURG TISS VISC PREMIERPRO EXOFIN HI/VISC FAST/DRY

## (undated) DEVICE — SKIN PREP TRAY W/CHG: Brand: MEDLINE INDUSTRIES, INC.

## (undated) DEVICE — SUT VIC 2/0 CT1 CR8 18IN J839D

## (undated) DEVICE — SUT VIC 3/0 CTI 36IN J944H

## (undated) DEVICE — PATIENT RETURN ELECTRODE, SINGLE-USE, CONTACT QUALITY MONITORING, ADULT, WITH 9FT CORD, FOR PATIENTS WEIGING OVER 33LBS. (15KG): Brand: MEGADYNE

## (undated) DEVICE — GLV SURG BIOGEL LTX PF 7

## (undated) DEVICE — OSC VAGINAL HYSTERECTOMY: Brand: MEDLINE INDUSTRIES, INC.

## (undated) DEVICE — SUT VIC 0 CT1 36IN J946H

## (undated) DEVICE — PK HYST ABD 40